# Patient Record
Sex: MALE | Race: AMERICAN INDIAN OR ALASKA NATIVE
[De-identification: names, ages, dates, MRNs, and addresses within clinical notes are randomized per-mention and may not be internally consistent; named-entity substitution may affect disease eponyms.]

---

## 2017-07-25 ENCOUNTER — HOSPITAL ENCOUNTER (EMERGENCY)
Dept: HOSPITAL 43 - DL.ED | Age: 27
Discharge: HOME | End: 2017-07-25
Payer: MEDICAID

## 2017-07-25 VITALS — DIASTOLIC BLOOD PRESSURE: 86 MMHG | SYSTOLIC BLOOD PRESSURE: 149 MMHG

## 2017-07-25 DIAGNOSIS — E66.9: ICD-10-CM

## 2017-07-25 DIAGNOSIS — J02.9: Primary | ICD-10-CM

## 2017-07-25 PROCEDURE — 87081 CULTURE SCREEN ONLY: CPT

## 2017-07-25 PROCEDURE — 99283 EMERGENCY DEPT VISIT LOW MDM: CPT

## 2017-07-25 PROCEDURE — 96372 THER/PROPH/DIAG INJ SC/IM: CPT

## 2017-07-25 PROCEDURE — 87430 STREP A AG IA: CPT

## 2017-07-25 NOTE — EDM.PDOC
ED HPI GENERAL MEDICAL PROBLEM





- General


Chief Complaint: ENT Problem


Stated Complaint: [id number] STREP THROAT


Time Seen by Provider: 07/25/17 11:05


Source of Information: Reports: Patient


History Limitations: Reports: No Limitations





- History of Present Illness


INITIAL COMMENTS - FREE TEXT/NARRATIVE: 





This 28 yo male patient reports to the ED with a 3 day history of a sore 

throat. The patient reports he has been taking some Penicillin (1 per day for 2 

days) with no symptom relief. Today, the patient reports he was feeling so bad, 

that he attempted to get into the clinic. The patient reports the clinic 

reported that they were booked and sent the patient to the ED. 


Onset Date: 07/22/17


Duration: Constant, Getting Worse


Location: Reports: Neck


Quality: Reports: Ache, Sharp


Severity: Moderate


Improves with: Reports: None


Worsens with: Reports: None


Associated Symptoms: Reports: No Other Symptoms


  ** Throat


Pain Score (Numeric/FACES): 7





- Related Data


 Allergies











Allergy/AdvReac Type Severity Reaction Status Date / Time


 


No Known Allergies Allergy   Verified 07/25/17 10:49











Home Meds: 


 Home Meds





. [No Known Home Meds]  05/03/14 [History]











Past Medical History





- Past Health History


Medical/Surgical History: Denies Medical/Surgical History


Endocrine/Metabolic History: Reports: Obesity/BMI 30+





Social & Family History





- Family History


Family Medical History: Noncontributory





- Tobacco Use


Smoking Status *Q: Never Smoker


Years of Tobacco use: 15


Used Tobacco, but Quit: No


Second Hand Smoke Exposure: No





- Caffeine Use


Caffeine Use: Reports: Soda





- Alcohol Use


Days Per Week of Alcohol Use: 2


Number of Drinks Per Day: 10


Total Drinks Per Week: 20





- Recreational Drug Use


Recreational Drug Use: No





ED ROS ENT





- Review of Systems


Review Of Systems: ROS reveals no pertinent complaints other than HPI.





ED EXAM, ENT





- Physical Exam


Exam: See Below


Exam Limited By: No Limitations


General Appearance: Alert, WD/WN, Moderate Distress, Obese


Eye Exam: Bilateral Eye: EOMI, Normal Inspection, PERRL


Ears: Normal External Exam, Normal Canal, Hearing Grossly Normal, Normal TMs


Nose: Normal Inspection, Normal Mucousa, No Blood


Mouth/Throat: Pharyngeal Erythema, Tonsillar Erythema, Tonsillar Exudates, 

Tonsillar Swelling


Head: Atraumatic, Normocephalic


Neck: Normal Inspection, Supple, Non-Tender, Full Range of Motion


Respiratory/Chest: No Respiratory Distress, Lungs Clear, Normal Breath Sounds, 

No Accessory Muscle Use, Chest Non-Tender


Cardiovascular: Normal Peripheral Pulses, Regular Rate, Rhythm, No Edema, No 

Gallop, No JVD, No Murmur, No Rub


GI/Abdominal: Normal Bowel Sounds, Soft, Non-Tender, No Organomegaly, No 

Distention, No Abnormal Bruit, No Mass


 (Male) Exam: Deferred


Rectal (Males) Exam: Deferred


Back: Normal Inspection, Full Range of Motion


Extremities: Normal Inspection, Normal Range of Motion, Non-Tender, No Pedal 

Edema, Normal Capillary Refill


Neurological: Alert, Oriented, CN II-XII Intact, Normal Cognition, Normal Gait, 

Normal Reflexes, No Motor/Sensory Deficits


Psychiatric: Normal Affect, Normal Mood


Skin: Warm, Dry, Intact, Normal Color, No Rash


Lymphatic: No Adenopathy





Course





- Vital Signs


Last Recorded V/S: 





 Last Vital Signs











Temp  37.6 C   07/25/17 10:50


 


Pulse  105 H  07/25/17 10:50


 


Resp  18   07/25/17 10:50


 


BP  149/86 H  07/25/17 10:50


 


Pulse Ox  93 L  07/25/17 10:50














- Orders/Labs/Meds


Orders: 





 Active Orders 24 hr











 Category Date Time Status


 


 STREP SCRN A RAPID W CULT CONF [RM] Stat Lab  07/25/17 11:01 Received














Departure





- Departure


Time of Disposition: 11:19


Disposition: Home, Self-Care 01


Condition: Fair


Clinical Impression: 


Pharyngitis


Qualifiers:


 Pharyngitis/tonsillitis etiology: unspecified etiology Qualified Code(s): 

J02.9 - Acute pharyngitis, unspecified








- Discharge Information


Instructions:  Pharyngitis, Easy-to-Read


Forms:  ED Department Discharge


Care Plan Goals: 


The patient was advised of the examination and lab results during the visit. 

The patient was given an injection of Bicillin while in the ED. The patient was 

discharged with a script for Azithromycin (250 mg) to take 2 by mouth on day 1 

and 1 by mouth on days 2-5. If the patient has any additional symptoms or 

concerns, the patient should follow-up with his primary care provider or return 

to the emergency department. 





- My Orders


Last 24 Hours: 





My Active Orders





07/25/17 11:01


STREP SCRN A RAPID W CULT CONF [RM] Stat 














- Assessment/Plan


Last 24 Hours: 





My Active Orders





07/25/17 11:01


STREP SCRN A RAPID W CULT CONF [RM] Stat

## 2023-07-10 ENCOUNTER — HOSPITAL ENCOUNTER (EMERGENCY)
Dept: HOSPITAL 43 - DL.ED | Age: 33
End: 2023-07-10
Payer: MEDICAID

## 2023-07-10 VITALS — HEART RATE: 75 BPM | DIASTOLIC BLOOD PRESSURE: 98 MMHG | SYSTOLIC BLOOD PRESSURE: 146 MMHG

## 2023-07-10 DIAGNOSIS — Z53.21: Primary | ICD-10-CM

## 2024-08-10 ENCOUNTER — HOSPITAL ENCOUNTER (EMERGENCY)
Dept: HOSPITAL 43 - DL.ED | Age: 34
Discharge: HOME | End: 2024-08-10
Payer: COMMERCIAL

## 2024-08-10 VITALS — DIASTOLIC BLOOD PRESSURE: 88 MMHG | HEART RATE: 93 BPM | SYSTOLIC BLOOD PRESSURE: 143 MMHG

## 2024-08-10 DIAGNOSIS — Z86.16: ICD-10-CM

## 2024-08-10 DIAGNOSIS — J02.0: Primary | ICD-10-CM

## 2024-08-10 DIAGNOSIS — E66.9: ICD-10-CM

## 2024-08-10 DIAGNOSIS — R51.9: ICD-10-CM

## 2024-08-10 PROCEDURE — 96372 THER/PROPH/DIAG INJ SC/IM: CPT

## 2024-08-10 PROCEDURE — 99284 EMERGENCY DEPT VISIT MOD MDM: CPT

## 2024-08-10 PROCEDURE — 87430 STREP A AG IA: CPT

## 2024-08-10 RX ADMIN — KETOROLAC TROMETHAMINE ONE MG: 30 INJECTION, SOLUTION INTRAMUSCULAR at 02:08

## 2024-10-28 ENCOUNTER — HOSPITAL ENCOUNTER (EMERGENCY)
Dept: HOSPITAL 43 - DL.ED | Age: 34
Discharge: HOME | End: 2024-10-28
Payer: COMMERCIAL

## 2024-10-28 VITALS — HEART RATE: 68 BPM | SYSTOLIC BLOOD PRESSURE: 150 MMHG | DIASTOLIC BLOOD PRESSURE: 74 MMHG

## 2024-10-28 DIAGNOSIS — F15.10: ICD-10-CM

## 2024-10-28 DIAGNOSIS — R10.33: Primary | ICD-10-CM

## 2024-10-28 DIAGNOSIS — R79.82: ICD-10-CM

## 2024-10-28 DIAGNOSIS — R73.03: ICD-10-CM

## 2024-10-28 DIAGNOSIS — R74.02: ICD-10-CM

## 2024-10-28 DIAGNOSIS — Z86.16: ICD-10-CM

## 2024-10-28 DIAGNOSIS — E66.9: ICD-10-CM

## 2024-10-28 LAB
ALBUMIN SERPL-MCNC: 3.6 G/DL (ref 3.4–5)
ALBUMIN/GLOB SERPL: 0.8 {RATIO}
ALP SERPL-CCNC: 108 U/L (ref 46–116)
ALT SERPL-CCNC: 32 U/L (ref 16–63)
AMPHET UR QL SCN: POSITIVE
AMPHET UR QL SCN: POSITIVE
AMPHETAMINES UR QL SCN>500 NG/ML: NEGATIVE
ANION GAP SERPL CALC-SCNC: 17.5 MEQ/L (ref 7–13)
APPEARANCE UR: CLEAR
AST SERPL-CCNC: 23 U/L (ref 15–37)
BACTERIA URNS QL MICRO: (no result) /HPF
BARBITURATES UR QL SCN: NEGATIVE
BASOPHILS NFR BLD AUTO: 0.3 % (ref 0–1)
BILIRUB SERPL-MCNC: 0.3 MG/DL (ref 0.2–1)
BILIRUB UR STRIP-MCNC: NEGATIVE MG/DL
BUN SERPL-MCNC: 12 MG/DL (ref 7–18)
BUN/CREAT SERPL: 11.1
CALCIUM SERPL-MCNC: 9.6 MG/DL (ref 8.5–10.1)
CHLORIDE SERPL-SCNC: 102 MMOL/L (ref 98–107)
CO2 SERPL-SCNC: 22 MMOL/L (ref 21–32)
COLOR UR: YELLOW
CREAT CL 24H UR+SERPL-VRATE: (no result) ML/MIN
CREAT SERPL-MCNC: 1.08 MG/DL (ref 0.7–1.3)
CRP SERPL-MCNC: 1.09 NG/DL (ref ?–0.5)
EGFRCR SERPLBLD CKD-EPI 2021: 92 ML/MIN (ref 60–?)
EOSINOPHIL NFR BLD AUTO: 4.3 % (ref 1–3)
EPI CELLS #/AREA URNS HPF: (no result) /HPF
GLOBULIN SER-MCNC: 4.4 G/DL
GLUCOSE SERPL-MCNC: 222 MG/DL (ref 70–99)
GLUCOSE UR STRIP-MCNC: 100 MG/DL
HCT VFR BLD AUTO: 45.5 % (ref 40–54)
HGB BLD-MCNC: 14.5 G/DL (ref 14–18)
KETONES UR STRIP-MCNC: 40 MG/DL
LACTATE SERPL-SCNC: 5.3 MMOL/L (ref 0.4–2)
LIPASE SERPL-CCNC: 22 U/L (ref 16–77)
LYMPHOCYTES NFR BLD AUTO: 37.6 % (ref 20.5–50.1)
MCH RBC QN AUTO: 28.2 PG (ref 27–34)
MCHC RBC AUTO-ENTMCNC: 31.9 G/DL (ref 33–35)
MCHC RBC AUTO-ENTMCNC: 88.5 FL (ref 80–100)
MDMA UR QL SCN: NEGATIVE
MONOCYTES NFR BLD AUTO: 6 % (ref 2–8)
MUCOUS THREADS URNS QL MICRO: (no result) /LPF
NEUTROPHILS NFR BLD AUTO: 51.8 % (ref 42.2–75.2)
NITRITE UR QL: NEGATIVE
OXYCODONE UR QL SCN: NEGATIVE
PCP UR QL SCN: NEGATIVE
PH UR STRIP: 6 [PH] (ref 5–9)
PLATELET # BLD AUTO: 300 10^3/UL (ref 150–450)
POTASSIUM SERPL-SCNC: 3.5 MMOL/L (ref 3.5–5.1)
PROT SERPL-MCNC: 8 G/DL (ref 6.4–8.2)
PROT UR STRIP-MCNC: 30 MG/DL
RBC # BLD AUTO: 5.14 10^6/UL (ref 4.6–6.2)
RBC # URNS HPF: (no result) /HPF (ref 0–5)
RBC UR QL: (no result)
SODIUM SERPL-SCNC: 138 MMOL/L (ref 136–145)
SP GR UR STRIP: >= 1.03 (ref 1–1.03)
TRICYCLICS UR QL SCN: NEGATIVE
UROBILINOGEN UR STRIP-ACNC: 0.2 MG/DL (ref 0.2–1)
WBC # BLD AUTO: 10.7 10^3/UL (ref 5–10)
WBC UR QL: (no result) /HPF

## 2024-10-28 PROCEDURE — 80307 DRUG TEST PRSMV CHEM ANLYZR: CPT

## 2024-10-28 PROCEDURE — 84484 ASSAY OF TROPONIN QUANT: CPT

## 2024-10-28 PROCEDURE — 99284 EMERGENCY DEPT VISIT MOD MDM: CPT

## 2024-10-28 PROCEDURE — 80305 DRUG TEST PRSMV DIR OPT OBS: CPT

## 2024-10-28 PROCEDURE — 85025 COMPLETE CBC W/AUTO DIFF WBC: CPT

## 2024-10-28 PROCEDURE — 83605 ASSAY OF LACTIC ACID: CPT

## 2024-10-28 PROCEDURE — 74177 CT ABD & PELVIS W/CONTRAST: CPT

## 2024-10-28 PROCEDURE — 36415 COLL VENOUS BLD VENIPUNCTURE: CPT

## 2024-10-28 PROCEDURE — 80053 COMPREHEN METABOLIC PANEL: CPT

## 2024-10-28 PROCEDURE — 93005 ELECTROCARDIOGRAM TRACING: CPT

## 2024-10-28 PROCEDURE — 86140 C-REACTIVE PROTEIN: CPT

## 2024-10-28 PROCEDURE — 83690 ASSAY OF LIPASE: CPT

## 2024-10-28 PROCEDURE — 96361 HYDRATE IV INFUSION ADD-ON: CPT

## 2024-10-28 PROCEDURE — 96374 THER/PROPH/DIAG INJ IV PUSH: CPT

## 2024-10-28 PROCEDURE — 81001 URINALYSIS AUTO W/SCOPE: CPT

## 2024-10-28 PROCEDURE — 71045 X-RAY EXAM CHEST 1 VIEW: CPT

## 2024-10-28 RX ADMIN — IOPAMIDOL ONE ML: 612 INJECTION, SOLUTION INTRAVENOUS at 13:01

## 2024-10-28 RX ADMIN — Medication PRN ML: at 12:28
